# Patient Record
Sex: MALE | Race: WHITE | NOT HISPANIC OR LATINO | Employment: FULL TIME | URBAN - METROPOLITAN AREA
[De-identification: names, ages, dates, MRNs, and addresses within clinical notes are randomized per-mention and may not be internally consistent; named-entity substitution may affect disease eponyms.]

---

## 2020-08-07 NOTE — PROGRESS NOTES
SUBJECTIVE:   Yousuf is a 30 year old male who presents to clinic today to establish care and for annual wellness exam.    # Eosinophilic Esophagitis  - was diagnosed during evaluation for dysphagia  - EGD found stricture which was dilated  - biopsies consistent with eosinophilic esophagitis  - takes prilosec 2 weeks every 3 months which seems to be controlling the symptoms     # Health Maintenance  - HIV Screening: never done, will do with next lab draw  - STI Screening: declines  - BP:   BP Readings from Last 3 Encounters:   08/10/20 114/77   - Cholesterol: 19 Chol 167, TGY 74, HDL 49,    - Diabetes Screenin19 glucose 90  - (+) seatbelt use, doesn't bike  - has lost 15 lbs during the quarantine which he attributes to eating out less and drinking less  - Exercise: 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, walks 18 holes at least once a week - tries for twice  - would like to lose 10-15 lbs  - has been to the dentist in the past year - no concerns    Today's PHQ-2 Score:   PHQ-2 (  [employer]) 8/10/2020   Q1: Little interest or pleasure in doing things 0   Q2: Feeling down, depressed or hopeless 0   PHQ-2 Score 0     Review of Systems:   Constitutional - no fevers, chills, night sweats, unintentional weight loss/gain   Eyes - no vision concerns   Ears/Nose/Throat - no hearing concerns, no dysphagia/odynophagia   Cardiovascular - no chest pain, palpitations   Pulmonary - no shortness of breath, wheezing, coughing   GI - no abdominal pain, constipation, diarrhea, nausea, vomiting    - no dysuria, polyuria, hematuria   Musculoskeletal - no joint or muscle pain  Integument - no rash   Neuro - no weakness, numbness, paresthesia   Heme - no easy bruising/bleeding   Endocrine - no polyuria, weight loss/gain, dry skin, excessive sweating, hair loss   Psychiatric - no feelings of depressed mood or anhedonia in past 2 weeks   Allergic/Immunologic - no history of anaphylaxis, no history of recurrent  "infections    Past Medical History:   Diagnosis Date     Eosinophilic esophagitis      Past Surgical History:   Procedure Laterality Date     HC REMOVE TONSILS/ADENOIDS,<13 Y/O       HC TOOTH EXTRACTION W/FORCEP       Family History   Problem Relation Age of Onset     No Known Problems Mother      No Known Problems Father      Celiac Disease Sister      No Known Problems Brother      Diabetes Type 2  Paternal Grandfather      Heart Disease No family hx of      Prostate Cancer No family hx of      Colon Cancer No family hx of      Social History     Tobacco Use     Smoking status: Never Smoker     Smokeless tobacco: Never Used   Substance Use Topics     Alcohol use: Yes     Frequency: 4 or more times a week     Drinks per session: 1 or 2     Binge frequency: Less than monthly     Comment: wine or cocktail with dinner     Drug use: Never     Social History     Social History Narrative    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      I have reviewed the patient's past medical, surgical, family, and social history.     OBJECTIVE:   /77 (BP Location: Left arm, Patient Position: Chair, Cuff Size: Adult Large)   Pulse 77   Temp 98  F (36.7  C) (Temporal)   Ht 1.89 m (6' 2.4\")   Wt 108.9 kg (240 lb)   SpO2 98%   BMI 30.48 kg/m      Constitutional: well-appearing, appears stated age  Eyes: conjunctivae without erythema, sclera anicteric. Pupils equal, round, and reactive to light.   ENT: oropharynx clear, TM grey bilateral  Cardiac: regular rate and rhythm, normal S1/S2, no murmur/rubs/gallops  Respiratory: lungs clear to auscultation bilaterally, normal work of breathing, no wheezes/crackles  GI: abdomen soft, non-tender, non-distended  Extremities: warm and " well perfused, radial pulses 2+ and equal, cap refill brisk.  Lymph: no cervical or supraclavicular lymphadenopathy  Skin: no rashes, lesions, or wounds  Psych: affect is full and appropriate, speech is fluent and non-pressured    ASSESSMENT AND PLAN:     COUNSELING:   Reviewed preventive health counseling, as reflected in patient instructions       Regular exercise       Healthy diet/nutrition    (Z00.00) Visit for well man health check  (primary encounter diagnosis)  Comment: Age appropriate screening and preventive services provided. BP normal. Up to date on lipid and diabetes screening. Discussed weight loss strategies, ways to stay active as weather cools off. Let him know about [facility]org[facility]'s dietician services and Dr. Whitfield' weight management clinic should he encounter difficulties moving forward. Up to date on immunizations - recommended flu shot in 1-2 months.   Plan:     (K20.0) Eosinophilic esophagitis  Comment: Asymptomatic currently. Continue to monitor and take PRN PPI treatment.   Plan:     David Sutton MD  [facility]  08/10/2020, 8:31 AM

## 2020-08-10 ENCOUNTER — OFFICE VISIT (OUTPATIENT)
Dept: FAMILY MEDICINE | Facility: CLINIC | Age: 30
End: 2020-08-10
Payer: COMMERCIAL

## 2020-08-10 VITALS
WEIGHT: 240 LBS | OXYGEN SATURATION: 98 % | HEART RATE: 77 BPM | DIASTOLIC BLOOD PRESSURE: 77 MMHG | SYSTOLIC BLOOD PRESSURE: 114 MMHG | HEIGHT: 74 IN | TEMPERATURE: 98 F | BODY MASS INDEX: 30.8 KG/M2

## 2020-08-10 DIAGNOSIS — Z00.00 VISIT FOR WELL MAN HEALTH CHECK: Primary | ICD-10-CM

## 2020-08-10 DIAGNOSIS — K20.0 EOSINOPHILIC ESOPHAGITIS: ICD-10-CM

## 2020-08-10 RX ORDER — CETIRIZINE HYDROCHLORIDE, PSEUDOEPHEDRINE HYDROCHLORIDE 5; 120 MG/1; MG/1
1 TABLET, FILM COATED, EXTENDED RELEASE ORAL 2 TIMES DAILY PRN
COMMUNITY
End: 2020-12-22

## 2020-08-10 SDOH — HEALTH STABILITY: MENTAL HEALTH: HOW OFTEN DO YOU HAVE 6 OR MORE DRINKS ON ONE OCCASION?: LESS THAN MONTHLY

## 2020-08-10 SDOH — HEALTH STABILITY: MENTAL HEALTH: HOW OFTEN DO YOU HAVE A DRINK CONTAINING ALCOHOL?: 4 OR MORE TIMES A WEEK

## 2020-08-10 SDOH — HEALTH STABILITY: MENTAL HEALTH: HOW MANY STANDARD DRINKS CONTAINING ALCOHOL DO YOU HAVE ON A TYPICAL DAY?: 1 OR 2

## 2020-08-10 ASSESSMENT — MIFFLIN-ST. JEOR: SCORE: 2119.73

## 2020-08-10 ASSESSMENT — PAIN SCALES - GENERAL: PAINLEVEL: NO PAIN (0)

## 2020-08-10 NOTE — NURSING NOTE
"30 year old  Chief Complaint   Patient presents with     [facility]     Physical       Blood pressure 114/77, pulse 77, temperature 98  F (36.7  C), temperature source Temporal, height 1.89 m (6' 2.4\"), weight 108.9 kg (240 lb), SpO2 98 %. Body mass index is 30.48 kg/m .  There is no problem list on file for this patient.      Wt Readings from Last 2 Encounters:   08/10/20 108.9 kg (240 lb)     BP Readings from Last 3 Encounters:   08/10/20 114/77         No current outpatient medications on file.     No current facility-administered medications for this visit.        Social History     Tobacco Use     Smoking status: Never Smoker     Smokeless tobacco: Never Used   Substance Use Topics     Alcohol use: Yes     Frequency: 4 or more times a week     Drinks per session: 1 or 2     Binge frequency: Less than monthly     Drug use: Never       Health Maintenance Due   Topic Date Due     PREVENTIVE CARE VISIT  1990     HIV SCREENING  01/10/2004     PHQ-2  01/01/2020       No results found for: HARINI Snider CMA, JOHN  August 10, 2020 8:12 AM  "

## 2020-10-14 ENCOUNTER — TRANSFERRED RECORDS (OUTPATIENT)
Dept: HEALTH INFORMATION MANAGEMENT | Facility: CLINIC | Age: 30
End: 2020-10-14

## 2020-10-20 ENCOUNTER — MYC MEDICAL ADVICE (OUTPATIENT)
Dept: SLEEP MEDICINE | Facility: CLINIC | Age: 30
End: 2020-10-20

## 2020-10-20 RX ORDER — CEFUROXIME AXETIL 500 MG/1
TABLET ORAL
COMMUNITY
Start: 2020-10-15 | End: 2020-12-22

## 2020-10-20 NOTE — PROGRESS NOTES
"Yousuf Wyman is a 30 year old male who is being evaluated via a billable video visit.      The patient has been notified of following:     \"This video visit will be conducted via a call between you and your physician/provider. We have found that certain health care needs can be provided without the need for an in-person physical exam.  This service lets us provide the care you need with a video conversation.  If a prescription is necessary we can send it directly to your pharmacy.  If lab work is needed we can place an order for that and you can then stop by our lab to have the test done at a later time.    Video visits are billed at different rates depending on your insurance coverage.  Please reach out to your insurance provider with any questions.    If during the course of the call the physician/provider feels a video visit is not appropriate, you will not be charged for this service.\"    Patient has given verbal consent for Video visit? Yes  How would you like to obtain your AVS? MyChart  If you are dropped from the video visit, the video invite should be resent to: Send to e-mail at: [email or web address]  Will anyone else be joining your video visit? No        Video-Visit Details    Type of service:  Video Visit    Video Start Time: 11:14 AM  Video End Time: 11:45 PM    Originating Location (pt. Location): Home    Distant Location (provider location):  [facility]     Platform used for Video Visit: Sue    [facility]   Outpatient Sleep Medicine Consultation  October 21, 2020      Name: Yousuf Wyman MRN# [id number]   Age: 30 year old YOB: 1990     Date of Consultation: October 21, 2020  Consultation is requested by: David Sutton MD  [address] [place] [id number] David Sutton  Primary care provider: David Sutton         Reason for Sleep Consult:     Yousuf Wyman is a 30 year old " male for complaints of loud snoring with observed apneas for 5 years         Assessment and Plan:     Summary Sleep Diagnoses:      Clinical signs and symptoms of obstructive sleep apnea      Comorbid Diagnoses:      Esophagitis    Allergic rhinitis with allergy shots - fluticasone, cetirizine, yannick pot    Summary Recommendations:      Continue fluticasone and cetirizine therapy for allergic rhinitis    Home sleep testing with follow-up in 2 weeks for therapy determination    Treatment strategies for sleep apnea reviewed as well as potential risks of untreated disease long-term.  Role of allergic airway disease and alcohol also discussed.             History of Present Illness:     Yousuf Wyman is a 30 year old male with extremely loud snoring interrupted breathing and pathologic daytime sleepiness without features of type I narcolepsy.  The symptoms have been progressing over the past 4 to 5 years with variability and worsening during colds, nasal allergies and following occasional evening alcohol.      SLEEP-WAKE SCHEDULE:   Workday: Typical bedtime 10:30 PM with 5-minute sleep latency and no subsequent awakenings   Final awakening 545 with alarm clock  Nonwork day: Typical bedtime 11 PM less than 5 minutes sleep latency and final awakening 7 AM without an alarm clock  Uses phone computer time with  Naps 1 day/week for 2 to 3 hours and feels refreshed  No drowsy driving     SCALES       SLEEP APNEA: Stopbang score          SLEEP COMPLAINTS:  Cardio-respiratory    Snoring-7x/week snoring with gasping choking and apneas with increased leak movement and displacement of bedcovers; loud snoring there appearing with relationship with his partner  Dyspnea- denies  Morning headaches or confusion-denies  Coexisting Lung disease: denies    Coexisting Heart disease: denies    Does patient have a bed partner: denies  Has bed partner been sleeping separately because of snoring:  denies            RLS Screen: When you  try to relax in the evening or sleep at  night, do you ever have unpleasant, restless feelings in your  legs that can be relieved by walking or movement? denies    Periodic limb movement: denies    Narcolepsy:   denies sudden urges of sleep attacks    Cataplexy:  denies     Sleep paralysis:  denies      Hallucinations:   denies       Sleep Behaviors:    Leg symptoms/movements: denies    Motor restlessness:denies    Night terrors: denies    Bruxism: denies    Automatic behaviors: none    Other subjective complaints:    Anxiety or rumination denies    Pain and discomfort at  night: denies    Waking up with heart pounding or racing: denies    GERD or aspiration:denies         Parasomnia:   NREM - denies recurrent persistent confusional arousal, night eating, sleep walking or sleep terrors   REM  - denies dream enactment; injuries              Medications:     Current Outpatient Medications   Medication Sig     cefuroxime (CEFTIN) 500 MG tablet      cetirizine-pseudoePHEDrine ER (ZYRTEC-D) 5-120 MG 12 hr tablet Take 1 tablet by mouth 2 times daily as needed for allergies     No current facility-administered medications for this visit.         No Known Allergies         Past Medical History:     Past Medical History:   Diagnosis Date     Eosinophilic esophagitis              Past Surgical History:       Past Surgical History:   Procedure Laterality Date     HC REMOVE TONSILS/ADENOIDS,<11 Y/O       HC TOOTH EXTRACTION W/FORCEP              Social History:     Social History     Tobacco Use     Smoking status: Never Smoker     Smokeless tobacco: Never Used   Substance Use Topics     Alcohol use: Yes     Frequency: 4 or more times a week     Drinks per session: 1 or 2     Binge frequency: Less than monthly     Comment: wine or cocktail with dinner         Chemical History:  1 cup of coffee or tea and 1 soda  Occ 1-2 on weekends         Family History:     Family History   Problem Relation Age of Onset     No Known Problems  Mother      No Known Problems Father      Celiac Disease Sister      No Known Problems Brother      Diabetes Type 2  Paternal Grandfather      Heart Disease No family hx of      Prostate Cancer No family hx of      Colon Cancer No family hx of         Sleep Family Hx:        EDSON - suspect father and mother           Review of Systems:     A complete 10 point review of systems was negative other than HPI or as commented below:     none         Physical Examination:     Objective normal mandibular profile and dentition  Mallampati 2  Reported vitals:  There were no vitals taken for this visit.   healthy, alert and no distress  Psych: Alert and oriented times 3; coherent speech, normal   rate and volume, able to articulate logical thoughts, able   to abstract reason, no tangential thoughts, no hallucinations   or delusions  Patient affect is normal               Data: All pertinent previous laboratory data reviewed     No results found for: PH, PHARTERIAL, PO2, [id number], SAT, PCO2, HCO3, BASEEXCESS, KAUR, BEB  No results found for: TSH  No results found for: GLC  No results found for: HGB  No results found for: BUN, CR  No results found for: AST, ALT, GGT, ALKPHOS, BILITOTAL, BILICONJ, BILIDIRECT, MELODY  No results found for: UAMP, UBARB, BENZODIAZEUR, UCANN, UCOC, OPIT, UPCP        Copy to: David Sutton MD 10/21/2020

## 2020-10-20 NOTE — PATIENT INSTRUCTIONS
"MY TREATMENT INFORMATION FOR SLEEP APNEA-  Yousuf Wyman    DOCTOR : AVERY MOLINA MD        Am I having a home sleep study?  Watch this video:  /drop off device-   https://[email or web address].com/watch?v=yGGFBdELGhk    Frequently asked questions:  1. What is Obstructive Sleep Apnea (EDSON)? EDSON is the most common type of sleep apnea. Apnea means, \"without breath.\"  Apnea is most often caused by narrowing or collapse of the upper airway as muscles relax during sleep.   Almost everyone has occasional apneas. Most people with sleep apnea have had brief interruptions at night frequently for many years.  The severity of sleep apnea is related to how frequent and severe the events are.   2. What are the consequences of EDSON? Symptoms include: feeling sleepy during the day, snoring loudly, gasping or stopping of breathing, trouble sleeping, and occasionally morning headaches or heartburn at night.  Sleepiness can be serious and even increase the risk of falling asleep while driving. Other health consequences may include development of high blood pressure and other cardiovascular disease in persons who are susceptible. Untreated EDSON  can contribute to heart disease, stroke and diabetes.   3. What are the treatment options? In most situations, sleep apnea is a lifelong disease that must be managed with daily therapy. Medications are not effective for sleep apnea and surgery is generally not considered until other therapies have been tried. Your treatment is your choice . Continuous Positive Airway (CPAP) works right away and is the therapy that is effective in nearly everyone. An oral device to hold your jaw forward is usually the next most reliable option. Other options include postioning devices (to keep you off your back), weight loss, and surgery including a tongue pacing device. There is more detail about some of these options below.    Important tips for using CPAP and similar devices   Know your equipment:  CPAP is " continuous positive airway pressure that prevents obstructive sleep apnea by keeping the throat from collapsing while you are sleeping. In most cases, the device is  smart  and can slowly self-adjusts if your throat collapses and keeps a record every day of how well you are treated-this information is available to you and your care team.  BPAP is bilevel positive airway pressure that keeps your throat open and also assists each breath with a pressure boost to maintain adequate breathing.  Special kinds of BPAP are used in patients who have inadequate breathing from lung or heart disease. In most cases, the device is  smart  and can slowly self-adjusts to assist breathing. Like CPAP, the device keeps a record of how well you are treated.  Your mask is your connection to the device. You get to choose what feels most comfortable and the staff will help to make sure if fits. Here: are some examples of the different masks that are available:       Key points to remember on your journey with sleep apnea:  1. Sleep study.  PAP devices often need to be adjusted during a sleep study to show that they are effective and adjusted right.  2. Good tips to remember: Try wearing just the mask during a quiet time during the day so your body adapts to wearing it. A humidifier is recommended for comfort in most cases to prevent drying of your nose and throat. Allergy medication from your provider may help you if you are having nasal congestion.  3. Getting settled-in. It takes more than one night for most of us to get used to wearing a mask. Try wearing just the mask during a quiet time during the day so your body adapts to wearing it. A humidifier is recommended for comfort in most cases. Our team will work with you carefully on the first day and will be in contact within 4 days and again at 2 and 4 weeks for advice and remote device adjustments. Your therapy is evaluated by the device each day.   4. Use it every night. The more you  are able to sleep naturally for 7-8 hours, the more likely you will have good sleep and to prevent health risks or symptoms from sleep apnea. Even if you use it 4 hours it helps. Occasionally all of us are unable to use a medical therapy, in sleep apnea, it is not dangerous to miss one night.   5. Communicate. Call our skilled team on the number provided on the first day if your visit for problems that make it difficult to wear the device. Over 2 out of 3 patients can learn to wear the device long-term with help from our team. Remember to call our team or your sleep providers if you are unable to wear the device as we may have other solutions for those who cannot adapt to mask CPAP therapy. It is recommended that you sleep your sleep provider within the first 3 months and yearly after that if you are not having problems.   6. Use it for your health. We encourage use of CPAP masks during daytime quiet periods to allow your face and brain to adapt to the sensation of CPAP so that it will be a more natural sensation to awaken to at night or during naps. This can be very useful during the first few weeks or months of adapting to CPAP though it does not help medically to wear CPAP during wakefulness and  should not be used as a strategy just to meet guidelines.  7. Take care of your equipment. Make sure you clean your mask and tubing using directions every day and that your filter and mask are replaced as recommended or if they are not working.     BESIDES CPAP, WHAT OTHER THERAPIES ARE THERE?    Positioning Device  Positioning devices are generally used when sleep apnea is mild and only occurs on your back.This example shows a pillow that straps around the waist. It may be appropriate for those whose sleep study shows milder sleep apnea that occurs primarily when lying flat on one's back. Preliminary studies have shown benefit but effectiveness at home may need to be verified by a home sleep test. These devices are  generally not covered by medical insurance.  Examples of devices that maintain sleeping on the back to prevent snoring and mild sleep apnea.    Belt type body positioner  Http://[email or web address].com/    Electronic reminder  Http://nightshifttherapy.com/  Http://www.[employer].au/      Oral Appliance  What is oral appliance therapy?  An oral appliance device fits on your teeth at night like a retainer used after having braces. The device is made by a specialized dentist and requires several visits over 1-2 months before a manufactured device is made to fit your teeth and is adjusted to prevent your sleep apnea. Once an oral device is working properly, snoring should be improved. A home sleep test may be recommended at that time if to determine whether the sleep apnea is adequately treated.       Some things to remember:  -Oral devices are often, but not always, covered by your medical insurance. Be sure to check with your insurance provider.   -If you are referred for oral therapy, you will be given a list of specialized dentists to consider or you may choose to visit the Web site of the American Academy of Dental Sleep Medicine  -Oral devices are less likely to work if you have severe sleep apnea or are extremely overweight.     More detailed information  An oral appliance is a small acrylic device that fits over the upper and lower teeth  (similar to a retainer or a mouth guard). This device slightly moves jaw forward, which moves the base of the tongue forward, opens the airway, improves breathing for effective treat snoring and obstructive sleep apnea in perhaps 7 out of 10 people .  The best working devices are custom-made by a dental device  after a mold is made of the teeth 1, 2, 3.  When is an oral appliance indicated?  Oral appliance therapy is recommended as a first-line treatment for patients with primary snoring, mild sleep apnea, and for patients with moderate sleep apnea who prefer appliance  therapy to use of CPAP4, 5. Severity of sleep apnea is determined by sleep testing and is based on the number of respiratory events per hour of sleep.   How successful is oral appliance therapy?  The success rate of oral appliance therapy in patients with mild sleep apnea is 75-80% while in patients with moderate sleep apnea it is 50-70%. The chance of success in patients with severe sleep apnea is 40-50%. The research also shows that oral appliances have a beneficial effect on the cardiovascular health of EDSON patients at the same magnitude as CPAP therapy7.  Oral appliances should be a second-line treatment in cases of severe sleep apnea, but if not completely successful then a combination therapy utilizing CPAP plus oral appliance therapy may be effective. Oral appliances tend to be effective in a broad range of patients although studies show that the patients who have the highest success are females, younger patients, those with milder disease, and less severe obesity. 3, 6.   Finding a dentist that practices dental sleep medicine  Specific training is available through the American Academy of Dental Sleep Medicine for dentists interested in working in the field of sleep. To find a dentist who is educated in the field of sleep and the use of oral appliances, near you, visit the Web site of the American Academy of Dental Sleep Medicine.    References  1. Otilia et al. Objectively measured vs self-reported compliance during oral appliance therapy for sleep-disordered breathing. Chest 2013; 144(5): 7605-0476.  2. Chapin et al. Objective measurement of compliance during oral appliance therapy for sleep-disordered breathing. Thorax 2013; 68(1): 91-96.  3. Babs et al. Mandibular advancement devices in 620 men and women with EDSON and snoring: tolerability and predictors of treatment success. Chest 2004; 125: 4772-3701.  4. Timo, et al. Oral appliances for snoring and EDSON: a review. Sleep 2006; 29:  012-262.  5. stacie Velasco al. Oral appliance treatment for EDSON: an update. J Clin Sleep Med 2014; 10(2): 215-227.  6. Natalie et al. Predictors of OSAH treatment outcome. J Dent Res 2007; 86: 9427-2837.      Weight Loss:    Weight loss is a long-term strategy that may improve sleep apnea in some patients.    Weight management is a personal decision and the decision should be based on your interest and the potential benefits.  If you are interested in exploring weight loss strategies, the following discussion covers the impact on weight loss on sleep apnea and the approaches that may be successful.    Being overweight does not necessarily mean you will have health consequences.  Those who have BMI over 35 or over 27 with existing medical conditions carries greater risk.   Weight loss decreases severity of sleep apnea in most people with obesity. For those with mild obesity who have developed snoring with weight gain, even 15-30 pound weight loss can improve and occasionally eliminate sleep apnea.  Structured and life-long dietary and health habits are necessary to lose weight and keep healthier weight levels.     Though there may be significant health benefits from weight loss, long-term weight loss is very difficult to achieve- studies show success with dietary management in less than 10% of people. In addition, substantial weight loss may require years of dietary control and may be difficult if patients have severe obesity. In these cases, surgical management may be considered.  Finally, older individuals who have tolerated obesity without health complications may be less likely to benefit from weight loss strategies.        Your BMI is There is no height or weight on file to calculate BMI.  Weight management is a personal decision.  If you are interested in exploring weight loss strategies, the following discussion covers the approaches that may be successful. Body mass index (BMI) is one way to tell whether  you are at a healthy weight, overweight, or obese. It measures your weight in relation to your height.  A BMI of 18.5 to 24.9 is in the healthy range. A person with a BMI of 25 to 29.9 is considered overweight, and someone with a BMI of 30 or greater is considered obese. More than two-thirds of American adults are considered overweight or obese.  Being overweight or obese increases the risk for further weight gain. Excess weight may lead to heart disease and diabetes.  Creating and following plans for healthy eating and physical activity may help you improve your health.  Weight control is part of healthy lifestyle and includes exercise, emotional health, and healthy eating habits. Careful eating habits lifelong are the mainstay of weight control. Though there are significant health benefits from weight loss, long-term weight loss with diet alone may be very difficult to achieve- studies show long-term success with dietary management in less than 10% of people. Attaining a healthy weight may be especially difficult to achieve in those with severe obesity. In some cases, medications, devices and surgical management might be considered.  What can you do?  If you are overweight or obese and are interested in methods for weight loss, you should discuss this with your provider.     Consider reducing daily calorie intake by 500 calories.     Keep a food journal.     Avoiding skipping meals, consider cutting portions instead.    Diet combined with exercise helps maintain muscle while optimizing fat loss. Strength training is particularly important for building and maintaining muscle mass. Exercise helps reduce stress, increase energy, and improves fitness. Increasing exercise without diet control, however, may not burn enough calories to loose weight.       Start walking three days a week 10-20 minutes at a time    Work towards walking thirty minutes five days a week     Eventually, increase the speed of your walking for  1-2 minutes at time    In addition, we recommend that you review healthy lifestyles and methods for weight loss available through the National Institutes of Health patient information sites:  http://win.niddk.nih.gov/publications/index.htm    And look into health and wellness programs that may be available through your health insurance provider, employer, local community center, or andres club.          Surgery:    Surgery for obstructive sleep apnea is considered generally only when other therapies fail to work. Surgery may be discussed with you if you are having a difficult time tolerating CPAP and or when there is an abnormal structure that requires surgical correction.  Nose and throat surgeries often enlarge the airway to prevent collapse.  Most of these surgeries create pain for 1-2 weeks and up to half of the most common surgeries are not effective throughout life.  You should carefully discuss the benefits and drawbacks to surgery with your sleep provider and surgeon to determine if it is the best solution for you.   More information  Surgery for EDSON is directed at areas that are responsible for narrowing or complete obstruction of the airway during sleep.  There are a wide range of procedures available to enlarge and/or stabilize the airway to prevent blockage of breathing in the three major areas where it can occur: the palate, tongue, and nasal regions.  Successful surgical treatment depends on the accurate identification of the factors responsible for obstructive sleep apnea in each person.  A personalized approach is required because there is no single treatment that works well for everyone.  Because of anatomic variation, consultation with an examination by a sleep surgeon is a critical first step in determining what surgical options are best for each patient.  In some cases, examination during sedation may be recommended in order to guide the selection of procedures.  Patients will be counseled about  risks and benefits as well as the typical recovery course after surgery. Surgery is typically not a cure for a person s EDSON.  However, surgery will often significantly improve one s EDSON severity (termed  success rate ).  Even in the absence of a cure, surgery will decrease the cardiovascular risk associated with OSA7; improve overall quality of life8 (sleepiness, functionality, sleep quality, etc).      Palate Procedures:  Patients with EDSON often have narrowing of their airway in the region of their tonsils and uvula.  The goals of palate procedures are to widen the airway in this region as well as to help the tissues resist collapse.  Modern palate procedure techniques focus on tissue conservation and soft tissue rearrangement, rather than tissue removal.  Often the uvula is preserved in this procedure. Residual sleep apnea is common in patient after pharyngoplasty with an average reduction in sleep apnea events of 33%2.      Tongue Procedures:  ExamWhile patients are awake, the muscles that surround the throat are active and keep this region open for breathing. These muscles relax during sleep, allowing the tongue and other structures to collapse and block breathing.  There are several different tongue procedures available.  Selection of a tongue base procedure depends on characteristics seen on physical exam.  Generally, procedures are aimed at removing bulky tissues in this area or preventing the back of the tongue from falling back during sleep.  Success rates for tongue surgery range from 50-62%3.    Hypoglossal Nerve Stimulation:  Hypoglossal nerve stimulation has recently received approval from the [place] Food and Drug Administration for the treatment of obstructive sleep apnea.  This is based on research showing that the system was safe and effective in treating sleep apnea6.  Results showed that the median AHI score decreased 68%, from 29.3 to 9.0. This therapy uses an implant system that senses  breathing patterns and delivers mild stimulation to airway muscles, which keeps the airway open during sleep.  The system consists of three fully implanted components: a small generator (similar in size to a pacemaker), a breathing sensor, and a stimulation lead.  Using a small handheld remote, a patient turns the therapy on before bed and off upon awakening.    Candidates for this device must be greater than 22 years of age, have moderate to severe EDSON (AHI between 20-65), BMI less than 32, have tried CPAP/oral appliance without success, and have appropriate upper airway anatomy (determined by a sleep endoscopy performed by Dr. Foster).    Hypoglossal Nerve Stimulation Pathway:    The sleep surgeon s office will work with the patient through the insurance prior-authorization process (including communications and appeals).    Nasal Procedures:  Nasal obstruction can interfere with nasal breathing during the day and night.  Studies have shown that relief of nasal obstruction can improve the ability of some patients to tolerate positive airway pressure therapy for obstructive sleep apnea1.  Treatment options include medications such as nasal saline, topical corticosteroid and antihistamine sprays, and oral medications such as antihistamines or decongestants. Non-surgical treatments can include external nasal dilators for selected patients. If these are not successful by themselves, surgery can improve the nasal airway either alone or in combination with these other options.      Combination Procedures:  Combination of surgical procedures and other treatments may be recommended, particularly if patients have more than one area of narrowing or persistent positional disease.  The success rate of combination surgery ranges from 66-80%2,3.    References  1. Jesús MCCLENDON. The Role of the Nose in Snoring and Obstructive Sleep Apnoea: An Update.  Eur Arch Otorhinolaryngol. 2011; 268: 1365-73.  2.  Francisco LERNER; Jessica GOODE; Abdiel  JR; Pallanch JF; Ollie MILLS; Eric SG; Shari HARVEY. Surgical modifications of the upper airway for obstructive sleep apnea in adults: a systematic review and meta-analysis. SLEEP 2010;33(10):5176-9597. Chelsea ISAAC. Hypopharyngeal surgery in obstructive sleep apnea: an evidence-based medicine review.  Arch Otolaryngol Head Neck Surg. 2006 Feb;132(2):206-13.  3. Jeff YH1, Chandler Y, Fernandez ARIANA. The efficacy of anatomically based multilevel surgery for obstructive sleep apnea. Otolaryngol Head Neck Surg. 2003 Oct;129(4):327-35.  4. Chelsea ISAAC, Goldberg A. Hypopharyngeal Surgery in Obstructive Sleep Apnea: An Evidence-Based Medicine Review. Arch Otolaryngol Head Neck Surg. 2006 Feb;132(2):206-13.  5. Patricio PJ et al. Upper-Airway Stimulation for Obstructive Sleep Apnea.  N Engl J Med. 2014 Jan 9;370(2):139-49.  6. Ramsey Y et al. Increased Incidence of Cardiovascular Disease in Middle-aged Men with Obstructive Sleep Apnea. Am J Respir Crit Care Med; 2002 166: 159-165  7. Christopher EM et al. Studying Life Effects and Effectiveness of Palatopharyngoplasty (SLEEP) study: Subjective Outcomes of Isolated Uvulopalatopharyngoplasty. Otolaryngol Head Neck Surg. 2011; 144: 623-631.              Your BMI is There is no height or weight on file to calculate BMI.  Weight management is a personal decision.  If you are interested in exploring weight loss strategies, the following discussion covers the approaches that may be successful. Body mass index (BMI) is one way to tell whether you are at a healthy weight, overweight, or obese. It measures your weight in relation to your height.  A BMI of 18.5 to 24.9 is in the healthy range. A person with a BMI of 25 to 29.9 is considered overweight, and someone with a BMI of 30 or greater is considered obese. More than two-thirds of American adults are considered overweight or obese.  Being overweight or obese increases the risk for further weight gain. Excess weight may lead to heart disease and  diabetes.  Creating and following plans for healthy eating and physical activity may help you improve your health.  Weight control is part of healthy lifestyle and includes exercise, emotional health, and healthy eating habits. Careful eating habits lifelong are the mainstay of weight control. Though there are significant health benefits from weight loss, long-term weight loss with diet alone may be very difficult to achieve- studies show long-term success with dietary management in less than 10% of people. Attaining a healthy weight may be especially difficult to achieve in those with severe obesity. In some cases, medications, devices and surgical management might be considered.  What can you do?  If you are overweight or obese and are interested in methods for weight loss, you should discuss this with your provider.     Consider reducing daily calorie intake by 500 calories.     Keep a food journal.     Avoiding skipping meals, consider cutting portions instead.    Diet combined with exercise helps maintain muscle while optimizing fat loss. Strength training is particularly important for building and maintaining muscle mass. Exercise helps reduce stress, increase energy, and improves fitness. Increasing exercise without diet control, however, may not burn enough calories to loose weight.       Start walking three days a week 10-20 minutes at a time    Work towards walking thirty minutes five days a week     Eventually, increase the speed of your walking for 1-2 minutes at time    In addition, we recommend that you review healthy lifestyles and methods for weight loss available through the National Institutes of Health patient information sites:  http://win.niddk.nih.gov/publications/index.htm    And look into health and wellness programs that may be available through your health insurance provider, employer, local community center, or andres club.    Weight management plan: Patient was referred to their PCP to  discuss a diet and exercise plan.

## 2020-10-21 ENCOUNTER — VIRTUAL VISIT (OUTPATIENT)
Dept: SLEEP MEDICINE | Facility: CLINIC | Age: 30
End: 2020-10-21
Attending: FAMILY MEDICINE
Payer: COMMERCIAL

## 2020-10-21 DIAGNOSIS — R06.83 SNORING: ICD-10-CM

## 2020-10-21 DIAGNOSIS — G47.33 OSA (OBSTRUCTIVE SLEEP APNEA): Primary | ICD-10-CM

## 2020-10-21 DIAGNOSIS — G47.19 EXCESSIVE DAYTIME SLEEPINESS: ICD-10-CM

## 2020-10-21 PROCEDURE — 99203 OFFICE O/P NEW LOW 30 MIN: CPT | Mod: 95 | Performed by: INTERNAL MEDICINE

## 2020-11-19 ENCOUNTER — OFFICE VISIT (OUTPATIENT)
Dept: SLEEP MEDICINE | Facility: CLINIC | Age: 30
End: 2020-11-19
Payer: COMMERCIAL

## 2020-11-19 DIAGNOSIS — G47.33 OSA (OBSTRUCTIVE SLEEP APNEA): ICD-10-CM

## 2020-11-19 PROCEDURE — G0399 HOME SLEEP TEST/TYPE 3 PORTA: HCPCS | Mod: 52 | Performed by: INTERNAL MEDICINE

## 2020-11-20 ENCOUNTER — DOCUMENTATION ONLY (OUTPATIENT)
Dept: SLEEP MEDICINE | Facility: CLINIC | Age: 30
End: 2020-11-20
Payer: COMMERCIAL

## 2020-11-25 NOTE — PROGRESS NOTES
HST POST-STUDY QUESTIONNAIRE    1. What time did you go to bed?  12 am  2. How long do you think it took to fall asleep?  10 min  3. What time did you wake up to start the day?  545 am  4. Did you get up during the night at all?  once  5. If you woke up, do you remember approximately what time(s)? no  6. Did you have any difficulty with the equipment?  No  7. Did you us any type of treatment with this study?  None  8. Was the head of the bed elevated? No  9. Did you sleep in a recliner?  No  10. Did you stop using CPAP at least 3 days before this test?  NA  11. Any other information you'd like us to know? Had trouble falling asleep. Got into bed at 145 but it wasn't until midnight that I was tired enough to fall asleep.     This HSAT was performed using a Noxturnal T3 device which recorded snore, sound, movement activity, body position, nasal pressure, oronasal thermal airflow, pulse, oximetry and both chest and abdominal respiratory effort. HSAT data was restricted to the time patient states they were in bed.     HSAT was scored using 1B 4% hypopnea rule.     HST AHI (Non-PAT): 1.0  Snoring was reported as none.  Time with SpO2 below 89% was 0 minutes.   Overall signal quality was good     Pt will follow up with sleep provider to determine appropriate therapy.

## 2020-11-30 NOTE — PROCEDURES
"HOME SLEEP STUDY INTERPRETATION    Patient: Yousuf Wyman  MRN: [id number]  YOB: 1990  Study Date: 2020  Referring Provider: David Sutton;   Ordering Provider: AVERY MOLINA MD     Indications for Home Study: Yousuf Wyman is a 30 year old male with a history of allergic rhinitis who presents with symptoms suggestive of obstructive sleep apnea.    Estimated body mass index is 30.48 kg/m  as calculated from the following:    Height as of 8/10/20: 1.89 m (6' 2.4\").    Weight as of 8/10/20: 108.9 kg (240 lb).  Total score - Kaplan: 12 (10/20/2020  2:00 PM)  STOP-BAN/8    Data: A full night home sleep study was performed recording the standard physiologic parameters including body position, movement, sound, nasal pressure, thermal oral airflow, chest and abdominal movements with respiratory inductance plethysmography, and oxygen saturation by pulse oximetry. Pulse rate was estimated by oximetry recording. This study was considered adequate based on > 4 hours of quality oximetry and respiratory recording. As specified by the AASM Manual for the Scoring of Sleep and Associated events, version 2.3, Rule VIII.D 1B, 4% oxygen desaturation scoring for hypopneas is used as a standard of care on all home sleep apnea testing.    Analysis Time:  343 minutes    Respiration:   Sleep Associated Hypoxemia: sustained hypoxemia was not present. Baseline oxygen saturation was 93%.  Time with saturation less than or equal to 88% was 0 minutes. The lowest oxygen saturation was 88%.   Snoring: Snoring was presentn in 3% of record.  Respiratory events: The home study revealed a presence of 1 obstructive apneas and 0 mixed and central apneas. There were 5 hypopneas resulting in a combined apnea/hypopnea index [AHI] of 1 events per hour.  AHI was 1 per hour supine, - per hour prone, - per hour on left side, and 0 per hour on right side.   Pattern: Excluding events noted above, respiratory rate and " pattern was Normal.    Position: Percent of time spent: supine - 80%, prone - 0%, on left - 0%, on right - 20%.    Heart Rate: By pulse oximetry normal rate was noted.     Assessment:   Snoring without obstructive sleep apnea.  Sleep associated hypoxemia was not present.    Recommendations:  Consider management of nasal conditions, long-term weight loss and avoidance of alcohol before bedtime.  Suggest optimizing sleep hygiene and avoiding sleep deprivation.    Diagnosis Code(s): Snoring R06.83    AVERY MOLINA MD, November 30, 2020   Diplomate, American Board of Internal Medicine, Sleep Medicine

## 2020-12-22 NOTE — PROGRESS NOTES
"Yousuf Wyman is a 30 year old male who is being evaluated via a billable video visit.      The patient has been notified of following:     \"This video visit will be conducted via a call between you and your physician/provider. We have found that certain health care needs can be provided without the need for an in-person physical exam.  This service lets us provide the care you need with a video conversation.  If a prescription is necessary we can send it directly to your pharmacy.  If lab work is needed we can place an order for that and you can then stop by our lab to have the test done at a later time.    Video visits are billed at different rates depending on your insurance coverage.  Please reach out to your insurance provider with any questions.    If during the course of the call the physician/provider feels a video visit is not appropriate, you will not be charged for this service.\"    Patient has given verbal consent for Video visit? Yes  How would you like to obtain your AVS? MyChart  If you are dropped from the video visit, the video invite should be resent to: Send to e-mail at: [email or web address]  Will anyone else be joining your video visit? No        Video-Visit Details    Type of service:  Video Visit    Video Start Time: 10: 28 AM  Video End Time: 10:37 AM    Originating Location (pt. Location): Home    Distant Location (provider location):  [facility]     Platform used for Video Visit: Sue MOLINA MD     [facility]  Outpatient Sleep Medicine Consultation  December 23, 2020    Name: Yousuf Wyman MRN# [id number]   Age: 30 year old YOB: 1990     Date of Consultation: December 23, 2020  Consultation is requested by: No referring provider defined for this encounter.  Primary care provider: David Sutton  Yousuf Wyman is a 30 year old male              Assessment " "and Plan:     Snoring without significant sleep apnea    Comorbid Diagnoses:      Esophagitis    Allergic rhinitis with allergy shots - fluticasone, cetirizine, yannick pot     Summary Recommendations:       Continue fluticasone and cetirizine therapy for allergic rhinitis    Return if you have worsening daytime sleepiness sleep disruption or worsening snoring with apneas.             History of Present Illness:      Yousuf Wyman is a 30 year old male with extremely loud snoring interrupted breathing and pathologic daytime sleepiness..The symptoms have been progressing over the past 4 to 5 years with variability and worsening during colds, nasal allergies and following occasional evening alcohol.        SLEEP-WAKE SCHEDULE:   Workday: Typical bedtime 10:30 PM with 5-minute sleep latency and no subsequent awakenings   Final awakening 545 with alarm clock  Nonwork day: Typical bedtime 11 PM less than 5 minutes sleep latency and final awakening 7 AM without an alarm clock  Uses phone computer time with  Naps 1 day/week for 2 to 3 hours and feels refreshed  No drowsy driving       [facility]  MRN: [id number]  YOB: 1990  Study Date: 2020  Referring Provider: David Sutton;   Ordering Provider: AVERY MOLINA MD     Indications for Home Study: Yousuf Wyman is a 30 year old male with a history of allergic rhinitis who presents with symptoms suggestive of obstructive sleep apnea.     Estimated body mass index is 30.48 kg/m  as calculated from the following:    Height as of 8/10/20: 1.89 m (6' 2.4\").    Weight as of 8/10/20: 108.9 kg (240 lb).  Total score - [place]: 12 (10/20/2020  2:00 PM)  STOP-BAN/8     Data: A full night home sleep study was performed recording the standard physiologic parameters including body position, movement, sound, nasal pressure, thermal oral airflow, chest and abdominal movements with respiratory inductance plethysmography, and oxygen " saturation by pulse oximetry. Pulse rate was estimated by oximetry recording. This study was considered adequate based on > 4 hours of quality oximetry and respiratory recording. As specified by the AASM Manual for the Scoring of Sleep and Associated events, version 2.3, Rule VIII.D 1B, 4% oxygen desaturation scoring for hypopneas is used as a standard of care on all home sleep apnea testing.     Analysis Time:  343 minutes     Respiration:   Sleep Associated Hypoxemia: sustained hypoxemia was not present. Baseline oxygen saturation was 93%.  Time with saturation less than or equal to 88% was 0 minutes. The lowest oxygen saturation was 88%.   Snoring: Snoring was presentn in 3% of record.  Respiratory events: The home study revealed a presence of 1 obstructive apneas and 0 mixed and central apneas. There were 5 hypopneas resulting in a combined apnea/hypopnea index [AHI] of 1 events per hour.  AHI was 1 per hour supine, - per hour prone, - per hour on left side, and 0 per hour on right side.   Pattern: Excluding events noted above, respiratory rate and pattern was Normal.     Position: Percent of time spent: supine - 80%, prone - 0%, on left - 0%, on right - 20%.     Heart Rate: By pulse oximetry normal rate was noted.      Assessment:   Snoring without obstructive sleep apnea.  Sleep associated hypoxemia was not present.     Recommendations:  Consider management of nasal conditions, long-term weight loss and avoidance of alcohol before bedtime.  Suggest optimizing sleep hygiene and avoiding sleep deprivation.     Diagnosis Code(s): Snoring R06.83     AVERY MOLINA MD, November 30, 2020   Diplomate, American Board of Internal Medicine, Sleep Medicine    SCALES:           Medications:     No current outpatient medications on file.     No current facility-administered medications for this visit.         No Known Allergies         Past Medical History:     Does not need 02 supplement at night   Past Medical History:    Diagnosis Date     Eosinophilic esophagitis              Past Surgical History:    No h/o  upper airway surgery  Past Surgical History:   Procedure Laterality Date     HC REMOVE TONSILS/ADENOIDS,<10 Y/O       HC TOOTH EXTRACTION W/FORCEP                        Physical Examination:     Objective   Reported vitals:  There were no vitals taken for this visit.   healthy, alert and no distress  Psych: Alert and oriented times 3; coherent speech, normal   rate and volume, able to articulate logical thoughts, able   to abstract reason, no tangential thoughts, no hallucinations   or delusions  His affect is normal.         Copy to: David Sutton MD 12/23/2020     [facility]   Floor 1, [address]   [address], [place] [address]. [place] [address]   Appointments: [phone]    [facility] [place]  3rd Floor  [address], [place] [address]

## 2020-12-23 ENCOUNTER — VIRTUAL VISIT (OUTPATIENT)
Dept: SLEEP MEDICINE | Facility: CLINIC | Age: 30
End: 2020-12-23
Payer: COMMERCIAL

## 2020-12-23 DIAGNOSIS — G47.19 EXCESSIVE DAYTIME SLEEPINESS: Primary | ICD-10-CM

## 2020-12-23 PROCEDURE — 99213 OFFICE O/P EST LOW 20 MIN: CPT | Mod: 95 | Performed by: INTERNAL MEDICINE

## 2021-09-20 ENCOUNTER — OFFICE VISIT (OUTPATIENT)
Dept: FAMILY MEDICINE | Facility: CLINIC | Age: 31
End: 2021-09-20
Payer: COMMERCIAL

## 2021-09-20 VITALS — WEIGHT: 254 LBS | BODY MASS INDEX: 32.26 KG/M2

## 2021-09-20 DIAGNOSIS — Z71.3 DIETARY COUNSELING AND SURVEILLANCE: Primary | ICD-10-CM

## 2021-09-20 NOTE — PROGRESS NOTES
"[facility] Nutrition Assessment    Yousuf Wyman is a 31 year old male who presents for nutrition counseling related to weight maintenance. Yousuf describes that he's noticed he's putting on weight recently. Eating choices impacted by whether is wife is working or not (she is a resident and depending on her rotation he may need to cook for self). He does not know how to cook and does not particularly enjoy it, leading to more frozen foods and eating out when alone. Has tried dieting in the past (with a ) but does not find it sustainable. Feels culprits are portion sizes and the eating out/convenience meals at home.     Recent diet recall:  Breakfast: 1-2 cups of coffee, yogurt or breakfast crackers  Lunch: sandwich or frozen meal, if in the office will grab a sandwich out  Snack: string cheese or peanut butter  Dinner: protein, vegetable, optional starch. Frozen pizza, chipotle.   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   Physical activity: Golf. Hates running.   Vitamins/Supplements: none    Recent weights:   Wt Readings from Last 5 Encounters:   09/20/21 115.2 kg (254 lb)   08/10/20 108.9 kg (240 lb)     Estimated body mass index is 32.26 kg/m  as calculated from the following:    Height as of 8/10/20: 1.89 m (6' 2.4\").    Weight as of this encounter: 115.2 kg (254 lb).    Recent A1C values: No results found for: A1C  Recent lipid values:   No results found for: CHOL  No results found for: HDL  No results found for: LDL  No results found for: TRIG    Intervention(s):  Yousuf Wyman is a 31 year old male who presents for nutrition counseling related to weight maintenance. He has started to notice some weight gain over the past several years, and does not want to wait to make lifestyle changes. It appears large portion sizes as well as eating out/frozen meals are contributing to some weight " gain.   1. Limit dinner frozen pizza/convenience foods to once per week. Lunch goal will be to limit 2 per week. Cook or use leftovers for remainder of meals.   2. Sheet pan meals are a great quick method of cooking where leftovers can be used multiple ways (on grains, on salad, or by itself). Also can begin using your crockpot for soups or cooking protein.   3. Consider frozen vegetables for nights when eating alone. Aim for half your plate to be non-starchy vegetables.   4. Before plating dinner, plate leftovers to both limit overeating and to stick to goal of reducing eating out at lunches.     Monitoring/Evaluation:  Monday November 15 at 11:20 am in clinic    Patient referred by aDvid Sutton MD   Total time spent with patient 30 minutes    Jessenia Jorgensen RD

## 2021-09-20 NOTE — PATIENT INSTRUCTIONS
1. Limit dinner frozen pizza/convenience foods to once per week. Lunch goal will be to limit 2 per week. Cook or use leftovers for remainder of meals.   2. Sheet pan meals are a great quick method of cooking where leftovers can be used multiple ways (on grains, on salad, or by itself). Also can begin using your crockpot.   3. Consider frozen vegetables for nights when eating alone. Aim for half your plate to be non-starchy vegetables.   3. Before plating dinner, plate leftovers.     Monitoring/Evaluation:  Monday November 15 at 11:20 am in clinic    Jessenia Jorgensen RD

## 2021-09-25 ENCOUNTER — HEALTH MAINTENANCE LETTER (OUTPATIENT)
Age: 31
End: 2021-09-25

## 2021-10-27 ENCOUNTER — TRANSFERRED RECORDS (OUTPATIENT)
Dept: HEALTH INFORMATION MANAGEMENT | Facility: CLINIC | Age: 31
End: 2021-10-27
Payer: COMMERCIAL

## 2022-02-14 ENCOUNTER — TRANSFERRED RECORDS (OUTPATIENT)
Dept: HEALTH INFORMATION MANAGEMENT | Facility: CLINIC | Age: 32
End: 2022-02-14
Payer: COMMERCIAL

## 2022-05-13 ENCOUNTER — TRANSFERRED RECORDS (OUTPATIENT)
Dept: HEALTH INFORMATION MANAGEMENT | Facility: CLINIC | Age: 32
End: 2022-05-13
Payer: COMMERCIAL

## 2023-01-07 ENCOUNTER — HEALTH MAINTENANCE LETTER (OUTPATIENT)
Age: 33
End: 2023-01-07

## 2023-11-06 ENCOUNTER — TRANSFERRED RECORDS (OUTPATIENT)
Dept: HEALTH INFORMATION MANAGEMENT | Facility: CLINIC | Age: 33
End: 2023-11-06
Payer: COMMERCIAL

## 2024-02-10 ENCOUNTER — HEALTH MAINTENANCE LETTER (OUTPATIENT)
Age: 34
End: 2024-02-10